# Patient Record
Sex: MALE | Race: WHITE | NOT HISPANIC OR LATINO | Employment: OTHER | URBAN - METROPOLITAN AREA
[De-identification: names, ages, dates, MRNs, and addresses within clinical notes are randomized per-mention and may not be internally consistent; named-entity substitution may affect disease eponyms.]

---

## 2017-09-12 PROBLEM — D69.6 THROMBOCYTOPENIA: Status: ACTIVE | Noted: 2017-09-12

## 2017-10-18 PROBLEM — Z09 FOLLOW-UP EXAM, MORE THAN 1 YEAR SINCE PREVIOUS EXAM: Status: ACTIVE | Noted: 2017-10-18

## 2018-01-22 PROBLEM — Z09 FOLLOW-UP EXAM, MORE THAN 1 YEAR SINCE PREVIOUS EXAM: Status: RESOLVED | Noted: 2017-10-18 | Resolved: 2018-01-22

## 2020-11-10 PROBLEM — D75.9 IDIOPATHIC CYTOPENIA OF UNDETERMINED SIGNIFICANCE (ICUS): Status: ACTIVE | Noted: 2020-11-10

## 2021-05-06 ENCOUNTER — PATIENT MESSAGE (OUTPATIENT)
Dept: RESEARCH | Facility: HOSPITAL | Age: 76
End: 2021-05-06

## 2021-05-10 ENCOUNTER — PATIENT MESSAGE (OUTPATIENT)
Dept: RESEARCH | Facility: HOSPITAL | Age: 76
End: 2021-05-10

## 2021-09-29 PROBLEM — D61.818 PANCYTOPENIA: Status: ACTIVE | Noted: 2021-09-29

## 2021-09-29 PROBLEM — Z71.2 ENCOUNTER TO DISCUSS TEST RESULTS: Status: ACTIVE | Noted: 2021-09-29

## 2021-09-29 PROBLEM — D50.9 IRON DEFICIENCY ANEMIA: Status: ACTIVE | Noted: 2021-09-29

## 2021-10-21 PROBLEM — D47.2 MGUS (MONOCLONAL GAMMOPATHY OF UNKNOWN SIGNIFICANCE): Status: ACTIVE | Noted: 2021-10-21

## 2022-05-02 PROBLEM — R93.89 ABNORMAL FINDINGS ON DIAGNOSTIC IMAGING OF OTHER SPECIFIED BODY STRUCTURES: Status: ACTIVE | Noted: 2022-05-02

## 2022-11-02 PROBLEM — Z71.89 ENCOUNTER TO DISCUSS TREATMENT OPTIONS: Status: ACTIVE | Noted: 2022-11-02

## 2023-05-09 PROBLEM — Z71.9 ENCOUNTER FOR EDUCATION: Status: ACTIVE | Noted: 2023-05-09

## 2023-11-08 PROBLEM — D75.9 CLONAL CYTOPENIA OF UNDETERMINED SIGNIFICANCE (CCUS): Status: ACTIVE | Noted: 2023-11-08

## 2024-08-16 PROBLEM — I48.91 ATRIAL FIBRILLATION: Status: ACTIVE | Noted: 2024-08-16

## 2024-09-19 ENCOUNTER — TELEPHONE (OUTPATIENT)
Dept: GASTROENTEROLOGY | Facility: CLINIC | Age: 79
End: 2024-09-19

## 2024-09-19 NOTE — TELEPHONE ENCOUNTER
Pt notified referral received from Dr. Yadav and Tino for avms and anemia.  Attempt to schedule gi clinic appt with Dr. Cunningham.  Pt states wife will contact to schedule appt.

## 2024-09-23 ENCOUNTER — TELEPHONE (OUTPATIENT)
Dept: GASTROENTEROLOGY | Facility: CLINIC | Age: 79
End: 2024-09-23
Payer: MEDICARE

## 2024-09-23 NOTE — TELEPHONE ENCOUNTER
----- Message from Joanne Kumar sent at 9/23/2024  9:15 AM CDT -----  Contact: Maria G  Type:  Patient Returning Call    Who Called:shree yost  Who Left Message for Patient:office  Does the patient know what this is regarding?:schedule appt  Would the patient rather a call back or a response via [facility]? call  Best Call Back Number:[phone]  Additional Information:  please call this number to schedule appt

## 2024-09-23 NOTE — TELEPHONE ENCOUNTER
----- Message from Joanne Kumar sent at 9/20/2024 10:50 AM CDT -----  Type:  Needs Medical Advice    Who Called: pt  wife Maria G   Symptoms (please be specific): pt needs a call back to make the appt for his referral as soon as possible   Would the patient rather a call back or a response via [facility]? call  Best Call Back Number: [phone]  Additional Information:  
Clinic appt scheduled with wife on Wednesday, September 25, 2024 at 1030am from referral by Rodriguez Yadav and Tino.  Clinic address and instructions to check in on 1st floor MOB prior to coming to [address] given and repeated correctly.  
Abdominal Pain, N/V/D

## 2024-09-25 ENCOUNTER — OFFICE VISIT (OUTPATIENT)
Dept: GASTROENTEROLOGY | Facility: CLINIC | Age: 79
End: 2024-09-25
Payer: MEDICARE

## 2024-09-25 VITALS
SYSTOLIC BLOOD PRESSURE: 115 MMHG | HEIGHT: 76 IN | HEART RATE: 70 BPM | WEIGHT: 285.69 LBS | DIASTOLIC BLOOD PRESSURE: 71 MMHG | BODY MASS INDEX: 34.79 KG/M2

## 2024-09-25 DIAGNOSIS — D50.0 IRON DEFICIENCY ANEMIA DUE TO CHRONIC BLOOD LOSS: Primary | ICD-10-CM

## 2024-09-25 PROCEDURE — 99204 OFFICE O/P NEW MOD 45 MIN: CPT | Mod: S$PBB,,, | Performed by: INTERNAL MEDICINE

## 2024-09-25 PROCEDURE — 99999 PR PBB SHADOW E&M-EST. PATIENT-LVL IV: CPT | Mod: PBBFAC,,, | Performed by: INTERNAL MEDICINE

## 2024-09-25 PROCEDURE — 99214 OFFICE O/P EST MOD 30 MIN: CPT | Mod: PBBFAC,PN | Performed by: INTERNAL MEDICINE

## 2024-09-25 NOTE — PROGRESS NOTES
"[facility]    CC: Anemia     HPI 79 y.o. male with a history of  constipation, controlled on linzess,  chronic iron deficiney anemia for at least 6 years as well as A-fib previously on xarelto s/p watchman (9/6/24), now just on ASA/Plavix. He recently underwent VCE which revealed a small amount of bleeding from suspected angioectasias in the proximal-mid small bowel. He has required multiple iron infusions with most recent approximately 3 weeks ago. He has required 4 blood transfusions, last received in June. He remains on carvedilol for atrial fibrillation.     Past Medical History  A-fib  MGUS  Cirrhosis     Physical Examination  /71 (BP Location: Left arm, Patient Position: Sitting, BP Method: Large (Automatic))   Pulse 70   Ht 6' 4" (1.93 m)   Wt 129.6 kg (285 lb 11.5 oz)   BMI 34.78 kg/m²   General appearance: alert, cooperative, no distress  Lungs: clear to auscultation bilaterally, no dullness to percussion bilaterally  Heart: regular rate and rhythm without rub; no displacement of the PMI   Abdomen: soft, non-tender; bowel sounds normoactive; no organomegaly    Labs  9/24: Hgb 9.0, MCV 90,Plt 85    Assessment:   79 y.o. male with a history of cirrhosis, constipation, controlled on linzess, chronic iron deficiney anemia for at least 6 years as well as A-fib previously on xarelto s/p watchman (9/6/24), now just on ASA/Plavix. He recently underwent VCE which revealed a small amount of bleeding from suspected angioectasias. He has not required a blood transfusion since starting iron infusions in June. Potential contributors of GI blood loss include angioectasias vs GAVE, portal hypertensive gastropathy or enteropathy.      Plan:  - Upper DBE 10/3/24 with close inspection for angioectasias, GAVE, portal hypertensive gastropathy/enteropathy   - Ok to continue ASA/Plavix for procedure  - Continue iron supplementation as primary therapy with oral iron once daily plus parenteral iron as needed " such to ensure adequate iron reserves (goal ferritin > 30)    Josh Cunningham MD   [address], [address]  [place] [address] [phone]

## 2024-09-25 NOTE — PATIENT INSTRUCTIONS
730am arrival to [facility] 1st floor Admit.    Upper DBE Instructions     [facility]  [address]  Clinic Office [phone]  Endoscopy Lab [phone]     You are scheduled for an Upper DBE with Dr. Cunningham on  Thursday, October 3, 2024 at [facility] in [place].    Check in at the Hospital -1st floor, Information desk.   Call [phone] to reschedule.     You cannot have anything to eat or drink after Midnight. You can brush your teeth with a sip of water.      An adult friend/family member must come with you to drive you home.  You cannot drive, take a taxi, Uber/Lyft or bus to leave the Endoscopy Center alone.  If you do not have someone to drive you home, your test will be cancelled.      Please follow the directions of your doctor if you take any pills that thin your blood. If you take these meds: Aggrenox, Brilinta, Effient, Eliquis, Lovenox, Plavix, Pletal, Pradaxa, Ticilid, Xarelto or Coumadin, let the doctor's office know.     Please hold any GLP-1 medications prior to the procedure: Dulaglutide Trulicity(hold week prior), Exenatide Byetta (hold the morning of procedure), Semaglutide Ozempic (hold week prior), Liraglutide Victoza, Saxenda(hold week prior), Lixisenatide Adlyxin (hold the morning of procedure), Semaglutide Rybelsus (hold the morning of procedure), Tirzepatide Mounjaro (hold week prior)      DON'T: On the morning of the test do not take insulin or pills for diabetes.      DO: On the morning of the test, do take any pills for blood pressure, heart, anti-rejection and or seizures with a small sip of water. Bring any inhalers with you.     Leave all valuables and jewelry at home. You will be at the hospital for 2-4 hours.     Call the Endoscopy department at [phone] with any questions about your procedure.     Thank you for choosing [facility].

## 2024-10-02 ENCOUNTER — ANESTHESIA EVENT (OUTPATIENT)
Dept: ENDOSCOPY | Facility: HOSPITAL | Age: 79
End: 2024-10-02
Payer: MEDICARE

## 2024-10-02 NOTE — H&P
[facility]    CC: Anemia     HPI 79 y.o. male with a history of  constipation, controlled on linzess,  chronic iron deficiney anemia for at least 6 years as well as A-fib previously on xarelto s/p watchman (9/6/24), now just on ASA/Plavix. He recently underwent VCE which revealed a small amount of bleeding from suspected angioectasias in the proximal-mid small bowel. He has required multiple iron infusions with most recent approximately 3 weeks ago. He has required 4 blood transfusions, last received in June. He remains on carvedilol for atrial fibrillation.     Past Medical History  A-fib  MGUS  Cirrhosis     Physical Examination  There were no vitals taken for this visit.  General appearance: alert, cooperative, no distress  Lungs: clear to auscultation bilaterally, no dullness to percussion bilaterally  Heart: regular rate and rhythm without rub; no displacement of the PMI   Abdomen: soft, non-tender; bowel sounds normoactive; no organomegaly    Labs  9/24: Hgb 9.0, MCV 90,Plt 85    Assessment:   79 y.o. male with a history of cirrhosis, constipation, controlled on linzess, chronic iron deficiney anemia for at least 6 years as well as A-fib previously on xarelto s/p watchman (9/6/24), now just on ASA/Plavix. He recently underwent VCE which revealed a small amount of bleeding from suspected angioectasias. He has not required a blood transfusion since starting iron infusions in June. Potential contributors of GI blood loss include angioectasias vs GAVE, portal hypertensive gastropathy or enteropathy.      Plan:  - Proceed with Upper DBE today with close inspection for angioectasias, GAVE, portal hypertensive gastropathy/enteropathy   - Ok to continue ASA/Plavix for procedure  - Continue iron supplementation as primary therapy with oral iron once daily plus parenteral iron as needed such to ensure adequate iron reserves (goal ferritin > 30)    Josh Cunningham MD   [address] [place] [address], [address]  [place]  [id number]   [phone]

## 2024-10-03 ENCOUNTER — HOSPITAL ENCOUNTER (OUTPATIENT)
Facility: HOSPITAL | Age: 79
Discharge: HOME | End: 2024-10-03
Attending: INTERNAL MEDICINE | Admitting: INTERNAL MEDICINE
Payer: MEDICARE

## 2024-10-03 ENCOUNTER — ANESTHESIA (OUTPATIENT)
Dept: ENDOSCOPY | Facility: HOSPITAL | Age: 79
End: 2024-10-03
Payer: MEDICARE

## 2024-10-03 VITALS
WEIGHT: 270 LBS | RESPIRATION RATE: 12 BRPM | BODY MASS INDEX: 32.88 KG/M2 | DIASTOLIC BLOOD PRESSURE: 52 MMHG | HEIGHT: 76 IN | SYSTOLIC BLOOD PRESSURE: 121 MMHG | TEMPERATURE: 98 F | HEART RATE: 69 BPM | OXYGEN SATURATION: 94 %

## 2024-10-03 DIAGNOSIS — D50.0 ANEMIA DUE TO GASTROINTESTINAL BLOOD LOSS: Primary | ICD-10-CM

## 2024-10-03 DIAGNOSIS — D64.9 ANEMIA: ICD-10-CM

## 2024-10-03 LAB
GLUCOSE SERPL-MCNC: 88 MG/DL (ref 70–110)
POCT GLUCOSE: 88 MG/DL (ref 70–110)

## 2024-10-03 PROCEDURE — 88305 TISSUE EXAM BY PATHOLOGIST: CPT | Performed by: PATHOLOGY

## 2024-10-03 PROCEDURE — 63600175 PHARM REV CODE 636 W HCPCS: Performed by: NURSE ANESTHETIST, CERTIFIED REGISTERED

## 2024-10-03 PROCEDURE — 27202087 HC PROBE, APC: Performed by: INTERNAL MEDICINE

## 2024-10-03 PROCEDURE — 27201089 HC SNARE, DISP (ANY): Performed by: INTERNAL MEDICINE

## 2024-10-03 PROCEDURE — 25000003 PHARM REV CODE 250: Performed by: NURSE ANESTHETIST, CERTIFIED REGISTERED

## 2024-10-03 PROCEDURE — 44799 UNLISTED PX SMALL INTESTINE: CPT | Performed by: INTERNAL MEDICINE

## 2024-10-03 PROCEDURE — 88341 IMHCHEM/IMCYTCHM EA ADD ANTB: CPT | Mod: 26,,, | Performed by: PATHOLOGY

## 2024-10-03 PROCEDURE — 25000003 PHARM REV CODE 250: Performed by: INTERNAL MEDICINE

## 2024-10-03 PROCEDURE — 88342 IMHCHEM/IMCYTCHM 1ST ANTB: CPT | Mod: 26,,, | Performed by: PATHOLOGY

## 2024-10-03 PROCEDURE — 27201028 HC NEEDLE, SCLERO: Performed by: INTERNAL MEDICINE

## 2024-10-03 PROCEDURE — 88305 TISSUE EXAM BY PATHOLOGIST: CPT | Mod: 26,,, | Performed by: PATHOLOGY

## 2024-10-03 PROCEDURE — 88341 IMHCHEM/IMCYTCHM EA ADD ANTB: CPT | Performed by: PATHOLOGY

## 2024-10-03 PROCEDURE — 88342 IMHCHEM/IMCYTCHM 1ST ANTB: CPT | Performed by: PATHOLOGY

## 2024-10-03 PROCEDURE — 82962 GLUCOSE BLOOD TEST: CPT | Performed by: INTERNAL MEDICINE

## 2024-10-03 PROCEDURE — 37000008 HC ANESTHESIA 1ST 15 MINUTES: Performed by: INTERNAL MEDICINE

## 2024-10-03 PROCEDURE — 37000009 HC ANESTHESIA EA ADD 15 MINS: Performed by: INTERNAL MEDICINE

## 2024-10-03 PROCEDURE — 27201238 HC BALLOON, OVERTUBE (ANY): Performed by: INTERNAL MEDICINE

## 2024-10-03 PROCEDURE — 27201036 HC POLYLOOP LIGATING DEVICE: Performed by: INTERNAL MEDICINE

## 2024-10-03 RX ORDER — FENTANYL CITRATE 50 UG/ML
INJECTION, SOLUTION INTRAMUSCULAR; INTRAVENOUS
Status: DISCONTINUED | OUTPATIENT
Start: 2024-10-03 | End: 2024-10-03

## 2024-10-03 RX ORDER — PROCHLORPERAZINE EDISYLATE 5 MG/ML
5 INJECTION INTRAMUSCULAR; INTRAVENOUS EVERY 30 MIN PRN
OUTPATIENT
Start: 2024-10-03

## 2024-10-03 RX ORDER — SODIUM CHLORIDE 0.9 % (FLUSH) 0.9 %
10 SYRINGE (ML) INJECTION
Status: DISCONTINUED | OUTPATIENT
Start: 2024-10-03 | End: 2024-10-03 | Stop reason: HOSPADM

## 2024-10-03 RX ORDER — OXYCODONE HYDROCHLORIDE 5 MG/1
5 TABLET ORAL
OUTPATIENT
Start: 2024-10-03

## 2024-10-03 RX ORDER — ONDANSETRON HYDROCHLORIDE 2 MG/ML
4 INJECTION, SOLUTION INTRAVENOUS ONCE AS NEEDED
OUTPATIENT
Start: 2024-10-03 | End: 2036-03-01

## 2024-10-03 RX ORDER — ROCURONIUM BROMIDE 10 MG/ML
INJECTION, SOLUTION INTRAVENOUS
Status: DISCONTINUED | OUTPATIENT
Start: 2024-10-03 | End: 2024-10-03

## 2024-10-03 RX ORDER — DEXAMETHASONE SODIUM PHOSPHATE 4 MG/ML
INJECTION, SOLUTION INTRA-ARTICULAR; INTRALESIONAL; INTRAMUSCULAR; INTRAVENOUS; SOFT TISSUE
Status: DISCONTINUED | OUTPATIENT
Start: 2024-10-03 | End: 2024-10-03

## 2024-10-03 RX ORDER — KETOROLAC TROMETHAMINE 30 MG/ML
15 INJECTION, SOLUTION INTRAMUSCULAR; INTRAVENOUS EVERY 8 HOURS PRN
OUTPATIENT
Start: 2024-10-03 | End: 2024-10-05

## 2024-10-03 RX ORDER — PROPOFOL 10 MG/ML
VIAL (ML) INTRAVENOUS
Status: DISCONTINUED | OUTPATIENT
Start: 2024-10-03 | End: 2024-10-03

## 2024-10-03 RX ORDER — GLUCAGON 1 MG
1 KIT INJECTION
OUTPATIENT
Start: 2024-10-03

## 2024-10-03 RX ORDER — PHENYLEPHRINE HYDROCHLORIDE 10 MG/ML
INJECTION INTRAVENOUS
Status: DISCONTINUED | OUTPATIENT
Start: 2024-10-03 | End: 2024-10-03

## 2024-10-03 RX ORDER — HYDROMORPHONE HYDROCHLORIDE 2 MG/ML
0.2 INJECTION, SOLUTION INTRAMUSCULAR; INTRAVENOUS; SUBCUTANEOUS EVERY 5 MIN PRN
OUTPATIENT
Start: 2024-10-03

## 2024-10-03 RX ORDER — LIDOCAINE HYDROCHLORIDE 20 MG/ML
INJECTION INTRAVENOUS
Status: DISCONTINUED | OUTPATIENT
Start: 2024-10-03 | End: 2024-10-03

## 2024-10-03 RX ORDER — ONDANSETRON HYDROCHLORIDE 2 MG/ML
INJECTION, SOLUTION INTRAVENOUS
Status: DISCONTINUED | OUTPATIENT
Start: 2024-10-03 | End: 2024-10-03

## 2024-10-03 RX ORDER — SUCCINYLCHOLINE CHLORIDE 20 MG/ML
INJECTION INTRAMUSCULAR; INTRAVENOUS
Status: DISCONTINUED | OUTPATIENT
Start: 2024-10-03 | End: 2024-10-03

## 2024-10-03 RX ORDER — SODIUM CHLORIDE 9 MG/ML
INJECTION, SOLUTION INTRAVENOUS CONTINUOUS
Status: DISCONTINUED | OUTPATIENT
Start: 2024-10-03 | End: 2024-10-03 | Stop reason: HOSPADM

## 2024-10-03 RX ADMIN — SUGAMMADEX 200 MG: 100 INJECTION, SOLUTION INTRAVENOUS at 09:10

## 2024-10-03 RX ADMIN — LIDOCAINE HYDROCHLORIDE 100 MG: 20 INJECTION, SOLUTION INTRAVENOUS at 08:10

## 2024-10-03 RX ADMIN — DEXAMETHASONE SODIUM PHOSPHATE 4 MG: 4 INJECTION, SOLUTION INTRAMUSCULAR; INTRAVENOUS at 08:10

## 2024-10-03 RX ADMIN — FENTANYL CITRATE 100 MCG: 50 INJECTION INTRAMUSCULAR; INTRAVENOUS at 08:10

## 2024-10-03 RX ADMIN — PROPOFOL 200 MG: 10 INJECTION, EMULSION INTRAVENOUS at 08:10

## 2024-10-03 RX ADMIN — ROCURONIUM BROMIDE 10 MG: 10 INJECTION, SOLUTION INTRAVENOUS at 08:10

## 2024-10-03 RX ADMIN — ONDANSETRON 4 MG: 2 INJECTION, SOLUTION INTRAMUSCULAR; INTRAVENOUS at 08:10

## 2024-10-03 RX ADMIN — PHENYLEPHRINE HYDROCHLORIDE 100 MCG: 10 INJECTION INTRAVENOUS at 09:10

## 2024-10-03 RX ADMIN — PHENYLEPHRINE HYDROCHLORIDE 100 MCG: 10 INJECTION INTRAVENOUS at 08:10

## 2024-10-03 RX ADMIN — SODIUM CHLORIDE: 9 INJECTION, SOLUTION INTRAVENOUS at 08:10

## 2024-10-03 RX ADMIN — SUCCINYLCHOLINE CHLORIDE 120 MG: 20 INJECTION, SOLUTION INTRAMUSCULAR; INTRAVENOUS at 08:10

## 2024-10-03 RX ADMIN — ROCURONIUM BROMIDE 20 MG: 10 INJECTION, SOLUTION INTRAVENOUS at 08:10

## 2024-10-03 RX ADMIN — PHENYLEPHRINE HYDROCHLORIDE 200 MCG: 10 INJECTION INTRAVENOUS at 09:10

## 2024-10-03 NOTE — ANESTHESIA POSTPROCEDURE EVALUATION
Anesthesia Post Evaluation    Patient: Jhonatan Salazar    Procedure(s) Performed: Procedure(s) (LRB):  ENTEROSCOPY, PROXIMAL (N/A)    Final Anesthesia Type: general      Patient location during evaluation: PACU  Patient participation: Yes- Able to Participate  Level of consciousness: awake and alert  Post-procedure vital signs: reviewed and stable  Pain management: adequate  Airway patency: patent    PONV status at discharge: No PONV  Anesthetic complications: no      Cardiovascular status: blood pressure returned to baseline  Respiratory status: unassisted  Hydration status: euvolemic  Follow-up not needed.          Vitals Value Taken Time   /61 10/03/24 1006   Temp 36.7 °C (98 °F) 10/03/24 0945   Pulse 70 10/03/24 1010   Resp 15 10/03/24 1009   SpO2 92 % 10/03/24 1010   Vitals shown include unfiled device data.      Event Time   Out of Recovery 10:06:40         Pain/Grabiel Score: Grabiel Score: 10 (10/3/2024 10:05 AM)

## 2024-10-03 NOTE — PLAN OF CARE
Dr. Cunningham visited at bedside, discussed findings and recommendations with patient and family member; all questions asked and answered. Verbalized understanding of information given. Handout provided at time of discharge.

## 2024-10-03 NOTE — ANESTHESIA PROCEDURE NOTES
Intubation    Date/Time: 10/3/2024 8:36 AM    Performed by: Cherelle Cruz  Authorized by: Peggy Vyas MD    Intubation:     Induction:  Intravenous    Intubated:  Postinduction    Mask Ventilation:  Very difficult with oral airway    Attempts:  1    Attempted By:  Student    Method of Intubation:  Video laryngoscopy    Blade:  Persaud 3    Laryngeal View Grade: Grade I - full view of cords      Difficult Airway Encountered?: Yes      Future Airway Recommendations:  Mgrath 4    Complications:  None    Airway Device:  Oral endotracheal tube    Airway Device Size:  7.5    Style/Cuff Inflation:  Cuffed (inflated to minimal occlusive pressure)    Tube secured:  22    Secured at:  The lips    Placement Verified By:  Capnometry    Complicating Factors:  None, anterior larynx and large/floppy epiglottis    Findings Post-Intubation:  BS equal bilateral

## 2024-10-03 NOTE — TRANSFER OF CARE
"Anesthesia Transfer of Care Note    Patient: Jhonatan Salazar    Procedure(s) Performed: Procedure(s) (LRB):  ENTEROSCOPY, PROXIMAL (N/A)    Patient location: PACU    Anesthesia Type: general    Transport from OR: Transported from OR on 6-10 L/min O2 by face mask with adequate spontaneous ventilation    Post pain: adequate analgesia    Post assessment: no apparent anesthetic complications    Post vital signs: stable    Level of consciousness: awake, alert and oriented    Nausea/Vomiting: no nausea/vomiting    Complications: none    Transfer of care protocol was followed      Last vitals: Visit Vitals  /62 (BP Location: Left arm, Patient Position: Lying)   Pulse 70   Temp 36.7 °C (98.1 °F) (Temporal)   Resp 19   Ht 6' 4" (1.93 m)   Wt 122.5 kg (270 lb)   SpO2 96%   BMI 32.87 kg/m²     "

## 2024-10-03 NOTE — ANESTHESIA PREPROCEDURE EVALUATION
10/03/2024  Jhonatan Salazar is a 79 y.o., male.  Past Medical History:   Diagnosis Date    Anemia, unspecified     Anticoagulant long-term use     Ascites     Atrial fibrillation     BPH (benign prostatic hyperplasia)     CHF (congestive heart failure)     Chronic HF (heart failure)     Cirrhosis of liver     COPD (chronic obstructive pulmonary disease)     Diabetes mellitus     Encounter for blood transfusion     Gout, unspecified     HLP (hyperkeratosis lenticularis perstans)     HTN (hypertension)     Liver disease     DUTCH (obstructive sleep apnea)     uses CPAP    Overweight     RBBB     Renal disorder     Serous retinal detachment, unspecified eye     left eye    Thrombocytopenia, unspecified      Past Surgical History:   Procedure Laterality Date    CARDIAC DEFIBRILLATOR PLACEMENT  >10 years    CLOSURE OF LEFT ATRIAL APPENDAGE USING DEVICE Right 9/6/2024    Procedure: Left atrial appendage closure device;  Surgeon: Lenin Negrete MD;  Location: [facility] CATH;  Service: Cardiology;  Laterality: Right;  DEFIBRILLATOR  ops#[id number]    COLONOSCOPY      PERITONEOCENTESIS N/A 06/10/2024    Procedure: PARACENTESIS, ABDOMINAL;  Surgeon: Provider, Dosc [facility];  Location: [facility] OR;  Service: General;  Laterality: N/A;    TRANSESOPHAGEAL ECHOCARDIOGRAPHY N/A 8/16/2024    Procedure: ECHOCARDIOGRAM, TRANSESOPHAGEAL;  Surgeon: Ben Kang MD;  Location: [facility] CATH;  Service: Cardiovascular;  Laterality: N/A;    UPPER GASTROINTESTINAL ENDOSCOPY       Pre-op Assessment       I have reviewed the Medications.     Review of Systems  Anesthesia Hx:  No problems with previous Anesthesia             Denies Family Hx of Anesthesia complications.     Social:  Former Smoker, No Alcohol Use       Cardiovascular:     Hypertension    Dysrhythmias atrial fibrillation  CHF                                  Pulmonary:   COPD     Sleep Apnea                Renal/:  Chronic Renal Disease                Hepatic/GI:      Liver Disease,            Endocrine:  Diabetes               Physical Exam  General: Well nourished    Airway:  Mallampati: II   TM Distance: Normal  Neck ROM: Normal ROM    Dental:  Intact    Chest/Lungs:  Clear to auscultation    Heart:  Rate: Normal  Rhythm: Regular Rhythm      Anesthesia Plan  Type of Anesthesia, risks & benefits discussed:    Anesthesia Type: Gen Natural Airway  Intra-op Monitoring Plan: Standard ASA Monitors  Post Op Pain Control Plan: multimodal analgesia  Induction:  IV  Informed Consent: Informed consent signed with the Patient and all parties understand the risks and agree with anesthesia plan.  All questions answered.   ASA Score: 3  Anesthesia Plan Notes: Spoke with the [employer] technical support person (Giuseppe) who stated that if cautery was to be used for the procedure, it should be done in short burst and back-up pacing should be available. He also confirmed that once magnet is removed the device would return to normal function. I discussed these recommendations with Dr Cunningham.    Ready For Surgery From Anesthesia Perspective.     .

## 2024-10-03 NOTE — [employer] PATIENT INSTRUCTIONS
Discharge Summary/Instructions after an Endoscopic Procedure  Patient Name: Jhonatan Salazar  Patient MRN: [id number]  Patient YOB: 1945  Thursday, October 3, 2024  Josh Cunningham MD  Dear patient,  As a result of recent federal legislation (The Federal Cures Act), you may   receive lab or pathology results from your procedure in your [facility]   account before your physician is able to contact you. Your physician or   their representative will relay the results to you with their   recommendations at their soonest availability.  Thank you,  Your health is very important to us during the Covid Crisis. Following your   procedure today, you will receive a daily text for 2 weeks asking about   signs or symptoms of Covid 19.  Please respond to this text when you   receive it so we can follow up and keep you as safe as possible.   RESTRICTIONS:  During your procedure today, you received medications for sedation.  These   medications may affect your judgment, balance and coordination.  Therefore,   for 24 hours, you have the following restrictions:   - DO NOT drive a car, operate machinery, make legal/financial decisions,   sign important papers or drink alcohol.    ACTIVITY:  Today: no heavy lifting, straining or running due to procedural   sedation/anesthesia.  The following day: return to full activity including work.  DIET:  Eat and drink normally unless instructed otherwise.     TREATMENT FOR COMMON SIDE EFFECTS:  - Mild abdominal pain, nausea, belching, bloating or excessive gas:  rest,   eat lightly and use a heating pad.  - Sore Throat: treat with throat lozenges and/or gargle with warm salt   water.  - Because air was used during the procedure, expelling large amounts of air   from your rectum or belching is normal.  - If a bowel prep was taken, you may not have a bowel movement for 1-3 days.    This is normal.  SYMPTOMS TO WATCH FOR AND REPORT TO YOUR PHYSICIAN:  1. Abdominal pain or bloating, other  than gas cramps.  2. Chest pain.  3. Back pain.  4. Signs of infection such as: chills or fever occurring within 24 hours   after the procedure.  5. Rectal bleeding, which would show as bright red, maroon, or black stools.   (A tablespoon of blood from the rectum is not serious, especially if   hemorrhoids are present.)  6. Vomiting.  7. Weakness or dizziness.  GO DIRECTLY TO THE NEAREST EMERGENCY ROOM IF YOU HAVE ANY OF THE FOLLOWING:      Difficulty breathing              Chills and/or fever over 101 F   Persistent vomiting and/or vomiting blood   Severe abdominal pain   Severe chest pain   Black, tarry stools   Bleeding- more than one tablespoon   Any other symptom or condition that you feel may need urgent attention  Your doctor recommends these additional instructions:  If any biopsies were taken, your doctors clinic will contact you in 1 to 2   weeks with any results.  - Start PPI therapy today and continue x 6 weeks to promote healing of   polypectomy site  - Await pathology results.   - Continue iron supplementation as primary therapy with oral iron once daily   plus parenteral iron as needed such to ensure adequate iron reserves (goal   ferritin > 30)  - If this patient's anemia worsens in the future, I will plan either repeat   EGD or EGD with push enteroscopy to re-evaluate for PHG, recurrent gastric   hyperplastic polyps and angioectasias  - Note plans to discontinue plavix in ~2 months following completion of   therapy after watchman procedure  - Discharge to home  - Resume previous diet and medications  - Condition stable   - The signs and symptoms of potential delayed complications were discussed   with the patient. If signs or symptoms of these complications develop, call   the [facility] at 1 [phone].   - Return to normal activities tomorrow.  Written discharge instructions were   provided to the patient.  For questions, problems or results please call your physician - Josh    MD [place].  EMERGENCY PHONE NUMBER: [phone],  LAB RESULTS: [phone]  IF A COMPLICATION OR EMERGENCY SITUATION ARISES AND YOU ARE UNABLE TO REACH   YOUR PHYSICIAN - GO DIRECTLY TO THE EMERGENCY ROOM.  MD Josh Denny MD  10/3/2024 10:35:43 AM  This report has been verified and signed electronically.  Dear patient,  As a result of recent federal legislation (The Federal Cures Act), you may   receive lab or pathology results from your procedure in your [facility]   account before your physician is able to contact you. Your physician or   their representative will relay the results to you with their   recommendations at their soonest availability.  Thank you,  PR[employer]ATION

## 2024-10-08 PROBLEM — D50.0 ANEMIA DUE TO GASTROINTESTINAL BLOOD LOSS: Status: ACTIVE | Noted: 2024-10-08

## 2024-10-08 LAB
COMMENT: NORMAL
FINAL PATHOLOGIC DIAGNOSIS: NORMAL
GROSS: NORMAL
Lab: NORMAL